# Patient Record
Sex: FEMALE | Race: WHITE | NOT HISPANIC OR LATINO | Employment: OTHER | RURAL
[De-identification: names, ages, dates, MRNs, and addresses within clinical notes are randomized per-mention and may not be internally consistent; named-entity substitution may affect disease eponyms.]

---

## 2021-08-05 ENCOUNTER — IMMUNIZATION (OUTPATIENT)
Dept: FAMILY MEDICINE | Facility: CLINIC | Age: 61
End: 2021-08-05
Payer: MEDICARE

## 2021-08-05 DIAGNOSIS — Z23 NEED FOR VACCINATION: Primary | ICD-10-CM

## 2021-08-05 PROCEDURE — 91301 COVID-19, MRNA, LNP-S, PF, 100 MCG/0.5 ML DOSE VACCINE: CPT | Mod: ,,, | Performed by: NURSE PRACTITIONER

## 2021-08-05 PROCEDURE — 0011A COVID-19, MRNA, LNP-S, PF, 100 MCG/0.5 ML DOSE VACCINE: ICD-10-PCS | Mod: ,,, | Performed by: NURSE PRACTITIONER

## 2021-08-05 PROCEDURE — 0011A COVID-19, MRNA, LNP-S, PF, 100 MCG/0.5 ML DOSE VACCINE: CPT | Mod: ,,, | Performed by: NURSE PRACTITIONER

## 2021-08-05 PROCEDURE — 91301 COVID-19, MRNA, LNP-S, PF, 100 MCG/0.5 ML DOSE VACCINE: ICD-10-PCS | Mod: ,,, | Performed by: NURSE PRACTITIONER

## 2024-11-07 ENCOUNTER — HOSPITAL ENCOUNTER (EMERGENCY)
Facility: HOSPITAL | Age: 64
Discharge: HOME | End: 2024-11-07
Payer: MEDICARE

## 2024-11-07 VITALS
DIASTOLIC BLOOD PRESSURE: 78 MMHG | HEIGHT: 68 IN | OXYGEN SATURATION: 97 % | RESPIRATION RATE: 19 BRPM | HEART RATE: 60 BPM | BODY MASS INDEX: 39.71 KG/M2 | TEMPERATURE: 98 F | SYSTOLIC BLOOD PRESSURE: 130 MMHG | WEIGHT: 262 LBS

## 2024-11-07 DIAGNOSIS — R04.0 RIGHT-SIDED EPISTAXIS: Primary | ICD-10-CM

## 2024-11-07 LAB
ALBUMIN SERPL BCP-MCNC: 3.3 G/DL (ref 3.5–5)
ALBUMIN/GLOB SERPL: 0.9 {RATIO}
ALP SERPL-CCNC: 89 U/L (ref 50–130)
ALT SERPL W P-5'-P-CCNC: 19 U/L (ref 13–56)
ANION GAP SERPL CALCULATED.3IONS-SCNC: 14 MMOL/L (ref 7–16)
APTT PPP: 28.9 SECONDS (ref 25.2–37.3)
AST SERPL W P-5'-P-CCNC: 8 U/L (ref 15–37)
BASOPHILS # BLD AUTO: 0.05 K/UL (ref 0–0.2)
BASOPHILS NFR BLD AUTO: 0.5 % (ref 0–1)
BILIRUB SERPL-MCNC: 0.3 MG/DL (ref ?–1.2)
BUN SERPL-MCNC: 19 MG/DL (ref 7–18)
BUN/CREAT SERPL: 17 (ref 6–20)
CALCIUM SERPL-MCNC: 8.6 MG/DL (ref 8.5–10.1)
CHLORIDE SERPL-SCNC: 105 MMOL/L (ref 98–107)
CO2 SERPL-SCNC: 28 MMOL/L (ref 21–32)
CREAT SERPL-MCNC: 1.1 MG/DL (ref 0.55–1.02)
DIFFERENTIAL METHOD BLD: ABNORMAL
EGFR (NO RACE VARIABLE) (RUSH/TITUS): 56 ML/MIN/1.73M2
EOSINOPHIL # BLD AUTO: 0.26 K/UL (ref 0–0.5)
EOSINOPHIL NFR BLD AUTO: 2.3 % (ref 1–4)
ERYTHROCYTE [DISTWIDTH] IN BLOOD BY AUTOMATED COUNT: 15.7 % (ref 11.5–14.5)
GLOBULIN SER-MCNC: 3.6 G/DL (ref 2–4)
GLUCOSE SERPL-MCNC: 79 MG/DL (ref 74–106)
HCT VFR BLD AUTO: 33.1 % (ref 38–47)
HGB BLD-MCNC: 10.4 G/DL (ref 12–16)
IMM GRANULOCYTES # BLD AUTO: 0.04 K/UL (ref 0–0.04)
IMM GRANULOCYTES NFR BLD: 0.4 % (ref 0–0.4)
INR BLD: 0.9
LYMPHOCYTES # BLD AUTO: 3.2 K/UL (ref 1–4.8)
LYMPHOCYTES NFR BLD AUTO: 28.8 % (ref 27–41)
MCH RBC QN AUTO: 30.1 PG (ref 27–31)
MCHC RBC AUTO-ENTMCNC: 31.4 G/DL (ref 32–36)
MCV RBC AUTO: 95.7 FL (ref 80–96)
MONOCYTES # BLD AUTO: 0.55 K/UL (ref 0–0.8)
MONOCYTES NFR BLD AUTO: 5 % (ref 2–6)
MPC BLD CALC-MCNC: 11.9 FL (ref 9.4–12.4)
NEUTROPHILS # BLD AUTO: 7 K/UL (ref 1.8–7.7)
NEUTROPHILS NFR BLD AUTO: 63 % (ref 53–65)
NRBC # BLD AUTO: 0 X10E3/UL
NRBC, AUTO (.00): 0 %
PLATELET # BLD AUTO: 240 K/UL (ref 150–400)
POTASSIUM SERPL-SCNC: 3.3 MMOL/L (ref 3.5–5.1)
PROT SERPL-MCNC: 6.9 G/DL (ref 6.4–8.2)
PROTHROMBIN TIME: 12.8 SECONDS (ref 11.7–14.7)
RBC # BLD AUTO: 3.46 M/UL (ref 4.2–5.4)
SODIUM SERPL-SCNC: 144 MMOL/L (ref 136–145)
WBC # BLD AUTO: 11.1 K/UL (ref 4.5–11)

## 2024-11-07 PROCEDURE — 36415 COLL VENOUS BLD VENIPUNCTURE: CPT | Performed by: NURSE PRACTITIONER

## 2024-11-07 PROCEDURE — 80053 COMPREHEN METABOLIC PANEL: CPT | Performed by: NURSE PRACTITIONER

## 2024-11-07 PROCEDURE — 85025 COMPLETE CBC W/AUTO DIFF WBC: CPT | Performed by: NURSE PRACTITIONER

## 2024-11-07 PROCEDURE — 85730 THROMBOPLASTIN TIME PARTIAL: CPT | Performed by: NURSE PRACTITIONER

## 2024-11-07 PROCEDURE — 85610 PROTHROMBIN TIME: CPT | Performed by: NURSE PRACTITIONER

## 2024-11-07 RX ORDER — BUPROPION HYDROCHLORIDE 300 MG/1
300 TABLET ORAL DAILY
COMMUNITY

## 2024-11-07 RX ORDER — NEBIVOLOL 10 MG/1
10 TABLET ORAL DAILY
COMMUNITY

## 2024-11-07 RX ORDER — AMLODIPINE BESYLATE 5 MG/1
5 TABLET ORAL DAILY
COMMUNITY

## 2024-11-07 RX ORDER — GABAPENTIN 600 MG/1
600 TABLET ORAL 3 TIMES DAILY
COMMUNITY

## 2024-11-07 RX ORDER — METFORMIN HYDROCHLORIDE 1000 MG/1
1000 TABLET ORAL 2 TIMES DAILY
COMMUNITY

## 2024-11-07 RX ORDER — GLIMEPIRIDE 4 MG/1
4 TABLET ORAL
COMMUNITY

## 2024-11-07 RX ORDER — LOSARTAN POTASSIUM 100 MG/1
100 TABLET ORAL DAILY
COMMUNITY

## 2024-11-07 RX ORDER — SUCRALFATE 1 G/1
1 TABLET ORAL 3 TIMES DAILY
COMMUNITY
Start: 2024-10-22

## 2024-11-07 RX ORDER — AMITRIPTYLINE HYDROCHLORIDE 50 MG/1
50 TABLET, FILM COATED ORAL NIGHTLY
COMMUNITY

## 2024-11-07 RX ORDER — PANTOPRAZOLE SODIUM 40 MG/1
40 TABLET, DELAYED RELEASE ORAL 2 TIMES DAILY
COMMUNITY
Start: 2024-09-18

## 2024-11-07 RX ORDER — FUROSEMIDE 40 MG/1
40 TABLET ORAL DAILY
COMMUNITY

## 2024-11-07 RX ORDER — DULOXETIN HYDROCHLORIDE 30 MG/1
60 CAPSULE, DELAYED RELEASE ORAL DAILY
COMMUNITY

## 2024-11-07 RX ORDER — LEVOTHYROXINE SODIUM 112 UG/1
112 TABLET ORAL
COMMUNITY

## 2024-11-08 NOTE — DISCHARGE INSTRUCTIONS
Do not blow or pick at your nose. May use saline nose spray to help keep lining of your nose moist. If your nose restarts bleeding, apply firm pressure and tilt head forward for at least 10 minutes before releasing pressure. If unable to stop bleeding or signs of bleeding, such as from urine or bowels, return to the ED. Follow-up with your PCP on Monday.

## 2024-11-08 NOTE — ED PROVIDER NOTES
Encounter Date: 11/7/2024       History     Chief Complaint   Patient presents with    Epistaxis     Presented with c/o nosebleed to right nare that lasted approx 45 min despite applying pressure. Has stopped upon arrival to the ED. States was bent over picking up something in her bedroom when she felt her nose start to run. Upon realizing that it was bleeding, she says that she started applying pressure. Denies history of nose bleed, use of ASA or blood thinner, injury, recent signs of sinusitis or headache.  Denies bleeding gums, obvious hematuria, or dark/tarry/bloody Bms.      Review of patient's allergies indicates:   Allergen Reactions    Penicillins Rash     Past Medical History:   Diagnosis Date    Diabetes mellitus     Hypertension     Thyroid disease      Past Surgical History:   Procedure Laterality Date    BACK SURGERY      GASTRECTOMY      HYSTERECTOMY      OOPHORECTOMY      TONSILLECTOMY       No family history on file.  Social History     Tobacco Use    Smoking status: Every Day     Types: Vaping with nicotine    Smokeless tobacco: Never   Substance Use Topics    Alcohol use: Never    Drug use: Never     Review of Systems   Constitutional:  Negative for activity change and fever.   HENT:  Positive for nosebleeds. Negative for congestion, ear pain, postnasal drip, rhinorrhea, sinus pressure, sinus pain, sore throat and trouble swallowing.    Eyes:  Negative for visual disturbance.   Respiratory:  Negative for cough and shortness of breath.    Cardiovascular:  Negative for chest pain.   Gastrointestinal:  Negative for abdominal pain, blood in stool, diarrhea, nausea and vomiting.   Genitourinary: Negative.    Musculoskeletal: Negative.    Skin: Negative.    Neurological: Negative.    Psychiatric/Behavioral: Negative.         Physical Exam     Initial Vitals [11/07/24 2133]   BP Pulse Resp Temp SpO2   (!) 146/76 72 19 98.2 °F (36.8 °C) 97 %      MAP       --         Physical Exam    Nursing note and  vitals reviewed.  Constitutional: She appears well-developed and well-nourished. No distress.   HENT:   Head: Normocephalic.   Right Ear: External ear normal.   Nose: Nasal deformity present. No mucosal edema or rhinorrhea. Sinus tenderness: erythema to bilat nasal mucosa. No active bleeding. No lesion..Mouth/Throat: Oropharynx is clear and moist.   Eyes: EOM are normal.   Neck:   Normal range of motion.  Cardiovascular:  Normal rate, regular rhythm, normal heart sounds and intact distal pulses.           No murmur heard.  Pulmonary/Chest: Breath sounds normal. She has no wheezes. She has no rhonchi. She has no rales.   Abdominal: Abdomen is soft. Bowel sounds are normal. There is no abdominal tenderness.   Musculoskeletal:         General: No edema. Normal range of motion.      Cervical back: Normal range of motion.     Neurological: She is alert and oriented to person, place, and time. She has normal strength. GCS score is 15. GCS eye subscore is 4. GCS verbal subscore is 5. GCS motor subscore is 6.   Skin: Skin is warm and dry. Capillary refill takes less than 2 seconds.   Psychiatric: She has a normal mood and affect.         Medical Screening Exam   See Full Note    ED Course   Procedures  Labs Reviewed   COMPREHENSIVE METABOLIC PANEL - Abnormal       Result Value    Sodium 144      Potassium 3.3 (*)     Chloride 105      CO2 28      Anion Gap 14      Glucose 79      BUN 19 (*)     Creatinine 1.10 (*)     BUN/Creatinine Ratio 17      Calcium 8.6      Total Protein 6.9      Albumin 3.3 (*)     Globulin 3.6      A/G Ratio 0.9      Bilirubin, Total 0.3      Alk Phos 89      ALT 19      AST 8 (*)     eGFR 56 (*)    CBC WITH DIFFERENTIAL - Abnormal    WBC 11.10 (*)     RBC 3.46 (*)     Hemoglobin 10.4 (*)     Hematocrit 33.1 (*)     MCV 95.7      MCH 30.1      MCHC 31.4 (*)     RDW 15.7 (*)     Platelet Count 240      MPV 11.9      Neutrophils % 63.0      Lymphocytes % 28.8      Monocytes % 5.0      Eosinophils %  2.3      Basophils % 0.5      Immature Granulocytes % 0.4      nRBC, Auto 0.0      Neutrophils, Abs 7.00      Lymphocytes, Absolute 3.20      Monocytes, Absolute 0.55      Eosinophils, Absolute 0.26      Basophils, Absolute 0.05      Immature Granulocytes, Absolute 0.04      nRBC, Absolute 0.00      Diff Type Auto     PROTIME-INR - Normal    PT 12.8      INR 0.90     APTT - Normal    PTT 28.9     CBC W/ AUTO DIFFERENTIAL    Narrative:     The following orders were created for panel order CBC Auto Differential.  Procedure                               Abnormality         Status                     ---------                               -----------         ------                     CBC with Differential[[id number]]       Abnormal            Final result                 Please view results for these tests on the individual orders.          Imaging Results    None          Medications - No data to display  Medical Decision Making  Presented with c/o nosebleed to right nare that lasted approx 45 min despite applying pressure. Has stopped upon arrival to the ED. States was bent over picking up something in her bedroom when she felt her nose start to run. Upon realizing that it was bleeding, she says that she started applying pressure. Denies history of nose bleed, use of ASA or blood thinner, injury, recent signs of sinusitis or headache. Denies bleeding gums, obvious hematuria, or dark/tarry/bloody Bms.    Amount and/or Complexity of Data Reviewed  Labs: ordered. Decision-making details documented in ED Course.     Details: PTT 28.9, INR 0.9, WBC 83625, H&H 10.4 and 33.1, plt 533324. Na 144, K+ 3.3, BUN 19,c reatinine 1.10, ALT 19, AST 8.    Risk  Risk Details: No further epistaxis while in ED. Labs reviewed with pt. She notes that even though she is on Lasix, she has never required potassium supp. Instructed pt eat potassium rich foods since it is slightly decreased today. Pt is stable. Bp 146/76, 72, 19, O2 sat 97%  on RA.  Instructed on home care, med use, follow-up and return precautions. Discharged home with detailed written instructions provided.                 ED Course as of 11/07/24 2244   Thu Nov 07, 2024 2226 PTT: 28.9 [DS]   2226 INR: 0.90 [DS]   2226 WBC(!): 11.10 [DS]   2226 Hemoglobin(!): 10.4 [DS]   2226 Hematocrit(!): 33.1 [DS]   2226 Platelet Count: 240 [DS]   2233 Sodium: 144 [DS]   2233 Potassium(!): 3.3 [DS]   2233 BUN(!): 19 [DS]   2233 Creatinine(!): 1.10 [DS]   2233 ALT: 19 [DS]   2233 AST(!): 8 [DS]      ED Course User Index  [DS] Aliya Mckeon NP                           Clinical Impression:   Final diagnoses:  [R04.0] Right-sided epistaxis (Primary)        ED Disposition Condition    Discharge Stable          ED Prescriptions    None       Follow-up Information       Follow up With Specialties Details Why Contact Info    Onesimo Viera MD Family Medicine On 11/11/2024  [address] [facility] [address]  [phone]      [facility] - Emergency Department Emergency Medicine  As needed [address] [place] [id number] [phone]             Aliya Mckeon NP  11/07/24 3062